# Patient Record
Sex: MALE | Race: BLACK OR AFRICAN AMERICAN | Employment: OTHER | URBAN - METROPOLITAN AREA
[De-identification: names, ages, dates, MRNs, and addresses within clinical notes are randomized per-mention and may not be internally consistent; named-entity substitution may affect disease eponyms.]

---

## 2017-02-21 ENCOUNTER — HOSPITAL ENCOUNTER (OUTPATIENT)
Dept: GENERAL RADIOLOGY | Age: 67
Discharge: HOME | End: 2017-02-21
Payer: MEDICARE

## 2017-02-21 DIAGNOSIS — R05.9 COUGH: ICD-10-CM

## 2017-02-21 PROCEDURE — 71020 XR CHEST PA LAT: CPT

## 2020-10-15 ENCOUNTER — TRANSCRIBE ORDER (OUTPATIENT)
Dept: SCHEDULING | Age: 70
End: 2020-10-15

## 2020-10-15 DIAGNOSIS — R19.06 EPIGASTRIC SWELLING OR MASS OR LUMP: Primary | ICD-10-CM

## 2020-10-15 DIAGNOSIS — M54.50 LUMBAGO: ICD-10-CM

## 2020-10-15 DIAGNOSIS — R10.13 ABDOMINAL PAIN, EPIGASTRIC: ICD-10-CM

## 2020-10-23 ENCOUNTER — HOSPITAL ENCOUNTER (OUTPATIENT)
Dept: CT IMAGING | Age: 70
Discharge: HOME | End: 2020-10-23
Attending: INTERNAL MEDICINE
Payer: MEDICARE

## 2020-10-23 DIAGNOSIS — M54.50 LUMBAGO: ICD-10-CM

## 2020-10-23 DIAGNOSIS — R10.13 ABDOMINAL PAIN, EPIGASTRIC: ICD-10-CM

## 2020-10-23 DIAGNOSIS — R19.06 EPIGASTRIC SWELLING OR MASS OR LUMP: ICD-10-CM

## 2020-10-23 PROCEDURE — 74176 CT ABD & PELVIS W/O CONTRAST: CPT

## 2020-10-23 PROCEDURE — 74011000255 HC RX REV CODE- 255: Performed by: INTERNAL MEDICINE

## 2020-10-23 RX ORDER — BARIUM SULFATE 20 MG/ML
900 SUSPENSION ORAL
Status: COMPLETED | OUTPATIENT
Start: 2020-10-23 | End: 2020-10-23

## 2020-10-23 RX ADMIN — BARIUM SULFATE 900 ML: 20 SUSPENSION ORAL at 18:35

## 2021-02-25 ENCOUNTER — OFFICE VISIT (OUTPATIENT)
Dept: SURGERY | Age: 71
End: 2021-02-25
Payer: MEDICARE

## 2021-02-25 VITALS
DIASTOLIC BLOOD PRESSURE: 90 MMHG | TEMPERATURE: 97.1 F | WEIGHT: 212 LBS | BODY MASS INDEX: 27.22 KG/M2 | SYSTOLIC BLOOD PRESSURE: 156 MMHG | HEART RATE: 56 BPM | OXYGEN SATURATION: 97 %

## 2021-02-25 DIAGNOSIS — Z90.79 STATUS POST PROSTATECTOMY: ICD-10-CM

## 2021-02-25 DIAGNOSIS — Z85.46 HISTORY OF PROSTATE CANCER: ICD-10-CM

## 2021-02-25 DIAGNOSIS — R10.31 RIGHT GROIN PAIN: Primary | ICD-10-CM

## 2021-02-25 DIAGNOSIS — R10.31 RIGHT LOWER QUADRANT ABDOMINAL PAIN: ICD-10-CM

## 2021-02-25 PROCEDURE — G8419 CALC BMI OUT NRM PARAM NOF/U: HCPCS | Performed by: SURGERY

## 2021-02-25 PROCEDURE — G8536 NO DOC ELDER MAL SCRN: HCPCS | Performed by: SURGERY

## 2021-02-25 PROCEDURE — 99205 OFFICE O/P NEW HI 60 MIN: CPT | Performed by: SURGERY

## 2021-02-25 PROCEDURE — G8427 DOCREV CUR MEDS BY ELIG CLIN: HCPCS | Performed by: SURGERY

## 2021-02-25 PROCEDURE — G8432 DEP SCR NOT DOC, RNG: HCPCS | Performed by: SURGERY

## 2021-02-25 PROCEDURE — 1101F PT FALLS ASSESS-DOCD LE1/YR: CPT | Performed by: SURGERY

## 2021-02-25 PROCEDURE — 3017F COLORECTAL CA SCREEN DOC REV: CPT | Performed by: SURGERY

## 2021-02-25 NOTE — PROGRESS NOTES
General Surgery Consult      Ya Merino  Admit date: (Not on file)    MRN: [id number]     : 1950     Age: 66 y.o. Attending Physician: Steven Denney MD, [facility]      History of Present Illness:     Kael Guzmán is a 66 y.o. male who was referred to me for evaluation of right groin pain and possible right inguinal hernia. The patient is relatively healthy but he has a history of hypertension and a history of prostate cancer in the past for which he underwent a robotic prostatectomy in . He stated that for the past few month his been noticing a right groin pain that radiates up toward his right lower quadrant in the abdomen. He stated that this has been happening since the end of last year and they usually it comes and goes but he can point at one area in the right groin where the pain is the most.  He said that the pain does not radiate to the scrotum or the thigh but it only radiate up toward the groin area and up toward the right side of the abdomen. He did not notice any bulge or mass. He stated though he was at the gym with his friend and he told him about the pain and according to him his friend told him that this is a hernia because he had the same symptoms and he was able to feel something in his groin area. The patient denies any nausea or vomiting or any change in bowel habits.   He denies any other abdominal surgeries except his robotic prostatectomy in the past.     Patient Active Problem List    Diagnosis Date Noted    Gross hematuria 2013    Malignant neoplasm of prostate ([address] 2012    Impotence of organic origin 2012     Past Medical History:   Diagnosis Date    Diabetes ([address]     GERD (gastroesophageal reflux disease)     HTN (hypertension)     Malignant neoplasm of prostate ([address]     PT2c NoMx azam 3 + 3 adenoacarcinoma of the prostate s/p DVP, PLND, Bilateral NS       Past Surgical History:   Procedure Laterality Date    HX COLONOSCOPY      HX OTHER SURGICAL  5/2015    Cervical     HX PROSTATECTOMY  2/19/07    HX ROTATOR CUFF REPAIR Bilateral       Social History     Tobacco Use    Smoking status: Former Smoker    Smokeless tobacco: Never Used    Tobacco comment: 12/2/2005   Substance Use Topics    Alcohol use: No      Social History     Tobacco Use   Smoking Status Former Smoker   Smokeless Tobacco Never Used   Tobacco Comment    12/2/2005     Family History   Problem Relation Age of Onset    Diabetes Mother       Current Outpatient Medications   Medication Sig    amLODIPine-benazepril (LOTREL) 5-40 mg per capsule Take 1 Cap by mouth daily.  ergocalciferol (VITAMIN D2) 50,000 unit capsule Take 50,000 Units by mouth every seven (7) days.  atorvastatin (LIPITOR) 20 mg tablet Take  by mouth nightly.  metFORMIN (GLUCOPHAGE) 500 mg tablet Take  by mouth two (2) times daily (with meals).  ezetimibe (ZETIA) 10 mg tablet Take  by mouth. No current facility-administered medications for this visit.        No Known Allergies     Review of Systems:  Constitutional: negative  Eyes: negative  Ears, Nose, Mouth, Throat, and Face: negative  Respiratory: negative  Cardiovascular: negative  Gastrointestinal: positive for abdominal pain and Right groin pain  Genitourinary:negative  Integument/Breast: negative  Hematologic/Lymphatic: negative  Musculoskeletal:negative  Neurological: negative  Behavioral/Psychiatric: negative  Endocrine: negative  Allergic/Immunologic: negative    Objective:     Visit Vitals  BP (!) 156/90 (BP 1 Location: Right arm, BP Patient Position: Sitting)   Pulse (!) 56   Temp 97.1 °F (36.2 °C)   Wt 96.2 kg (212 lb)   SpO2 97%   BMI 27.22 kg/m²       Physical Exam:      General:  in no apparent distress, alert, oriented times 3, afebrile, normal vitals and cooperative   Eyes:  conjunctivae and sclerae normal, pupils equal, round, reactive to light   Throat & Neck: no erythema or exudates noted and neck supple and symmetrical; no palpable masses   Lungs:   clear to auscultation bilaterally   Heart:  Regular rate and rhythm   Abdomen:   rounded, soft, nontender, nondistended, no masses or organomegaly. On examination of his right groin area there is significant tenderness at the internal ring with possible evidence of a hernia but I was not able to feel it very clearly. Scrotal exam is normal.    Extremities: extremities normal, atraumatic, no cyanosis or edema   Skin: Normal.       Imaging and Lab Review:     CBC:   Lab Results   Component Value Date/Time    WBC 7.0 04/25/2015 04:00 PM    RBC 5.40 04/25/2015 04:00 PM    HGB 14.7 04/25/2015 04:00 PM    HCT 44.2 04/25/2015 04:00 PM    PLATELET 120 38/75/7657 04:00 PM     BMP:   Lab Results   Component Value Date/Time    Glucose 134 (H) 04/25/2015 04:00 PM    Sodium 141 04/25/2015 04:00 PM    Potassium 3.6 04/25/2015 04:00 PM    Chloride 106 04/25/2015 04:00 PM    CO2 28 04/25/2015 04:00 PM    BUN 17 04/25/2015 04:00 PM    Creatinine 1.3 10/05/2019 08:55 AM    Calcium 9.5 04/25/2015 04:00 PM     CMP:  Lab Results   Component Value Date/Time    Glucose 134 (H) 04/25/2015 04:00 PM    Sodium 141 04/25/2015 04:00 PM    Potassium 3.6 04/25/2015 04:00 PM    Chloride 106 04/25/2015 04:00 PM    CO2 28 04/25/2015 04:00 PM    BUN 17 04/25/2015 04:00 PM    Creatinine 1.3 10/05/2019 08:55 AM    Calcium 9.5 04/25/2015 04:00 PM    Anion gap 7 04/25/2015 04:00 PM    BUN/Creatinine ratio 14 04/25/2015 04:00 PM    Alk. phosphatase 92 08/16/2010 02:44 PM    Protein, total 7.2 08/16/2010 02:44 PM    Albumin 4.3 08/16/2010 02:44 PM    Globulin 2.9 08/16/2010 02:44 PM    A-G Ratio 1.5 08/16/2010 02:44 PM       No results found for this or any previous visit (from the past 24 hour(s)).     images and reports reviewed    Assessment:   Michael Rios is a 66 y.o. male who is presenting with right groin pain of about 5 months duration with a physical exam that could be evidence of a right inguinal hernia but I explained to the patient that I am not 100% sure. He had a CT scan done last year that did not show any evidence of abdominal wall hernia but his symptoms according to him started after the CT scan was performed. His CT scan was done because of epigastric pain but not because of right groin pain. I explained to the patient that the best option is to get an ultrasound to confirm the presence of this hernia but I also explained to him that sometimes hernias are not seen on ultrasound especially if they are extremely small or if there is a femoral hernia. In case it is a hernia I did discussed the possibility of incarceration, strangulation, enlargement in size over time, and the risk of emergency surgery in the face of strangulation. I also discussed the use of prosthetic materials (mesh), including the risk of infection. Also discussed the risk of surgery including recurrence and the possible need for reoperation and removal of mesh if used, possibility of postoperative small bowel injury, obstruction or ileus, and the risks of general anesthetic. I explained to the the patient about the robotic hernia repair procedure.      Plan:     I placed an order of ultrasound of the right scrotum with the focusing in the right groin area  Follow-up with me after the results    Please call me if you have any questions (cell phone: [phone])     Signed By: Zack Poole MD     February 25, 2021

## 2021-02-25 NOTE — PROGRESS NOTES
Petar Goins is a 66 y.o. male (: 1950) presenting to address:    Chief Complaint   Patient presents with    New Patient     Right inguinal hernia x couple of months/ referred by Dr. Yusef Scherer       Medication list and allergies have been reviewed with Petar Goins and updated as of today's date. I have gone over all Medical, Surgical and Social History with Petar Goins and updated/added the information accordingly.

## 2021-02-26 ENCOUNTER — TELEPHONE (OUTPATIENT)
Dept: SURGERY | Age: 71
End: 2021-02-26

## 2021-02-26 DIAGNOSIS — R10.31 RIGHT GROIN PAIN: ICD-10-CM

## 2021-02-26 NOTE — TELEPHONE ENCOUNTER
Spoke to Mr. Leola Faust regarding request to schedule hernia surgery at [facility] because his wife doesn't drive and it's difficult for him to get a friend/family member to

## 2021-03-01 ENCOUNTER — HOSPITAL ENCOUNTER (OUTPATIENT)
Dept: ULTRASOUND IMAGING | Age: 71
Discharge: HOME | End: 2021-03-01
Attending: SURGERY
Payer: MEDICARE

## 2021-03-01 PROCEDURE — 76870 US EXAM SCROTUM: CPT

## 2021-03-11 ENCOUNTER — OFFICE VISIT (OUTPATIENT)
Dept: SURGERY | Age: 71
End: 2021-03-11
Payer: MEDICARE

## 2021-03-11 VITALS
WEIGHT: 206 LBS | DIASTOLIC BLOOD PRESSURE: 86 MMHG | RESPIRATION RATE: 16 BRPM | HEART RATE: 60 BPM | HEIGHT: 74 IN | BODY MASS INDEX: 26.44 KG/M2 | SYSTOLIC BLOOD PRESSURE: 148 MMHG | TEMPERATURE: 97.3 F

## 2021-03-11 DIAGNOSIS — R10.31 RIGHT GROIN PAIN: Primary | ICD-10-CM

## 2021-03-11 DIAGNOSIS — R10.31 RIGHT LOWER QUADRANT ABDOMINAL PAIN: ICD-10-CM

## 2021-03-11 PROCEDURE — G8536 NO DOC ELDER MAL SCRN: HCPCS | Performed by: SURGERY

## 2021-03-11 PROCEDURE — 99215 OFFICE O/P EST HI 40 MIN: CPT | Performed by: SURGERY

## 2021-03-11 PROCEDURE — G8419 CALC BMI OUT NRM PARAM NOF/U: HCPCS | Performed by: SURGERY

## 2021-03-11 PROCEDURE — 3017F COLORECTAL CA SCREEN DOC REV: CPT | Performed by: SURGERY

## 2021-03-11 PROCEDURE — G8432 DEP SCR NOT DOC, RNG: HCPCS | Performed by: SURGERY

## 2021-03-11 PROCEDURE — G8427 DOCREV CUR MEDS BY ELIG CLIN: HCPCS | Performed by: SURGERY

## 2021-03-11 PROCEDURE — 1101F PT FALLS ASSESS-DOCD LE1/YR: CPT | Performed by: SURGERY

## 2021-03-11 NOTE — PROGRESS NOTES
1. Have you been to the ER, urgent care clinic since your last visit? Hospitalized since your last visit? No    2. Have you seen or consulted any other health care providers outside of the [address] [place] [address] since your last visit? Include any pap smears or colon screening. No     Patient presents for test results from [facility] [facility] on 3/1/21.

## 2021-03-11 NOTE — PATIENT INSTRUCTIONS
If you have any questions or concerns about today's appointment, the verbal and/or written instructions you were given for follow up care, please call our office at [phone]. [facility] - [address], [address] 
 
[phone] office [phone] fax PATIENT PRE AND POST OPERATIVE INSTRUCTIONS [facility] [address] [place] [address], [address] 
[phone] Before Surgery Instructions:  
1) You must have someone available to drive you to and from your procedure and stay with you for the first 24 hours. 2) It is very important that you have nothing to eat or drink after midnight the night before your surgery. This includes chewing gum or sucking on hard candy. Take only heart, blood pressure and cholesterol medications the morning of surgery with only a sip of water. 3) Please stop taking Plavix 5-7 days prior to your surgery. Stop taking Coumadin 5 days prior to your surgery. Stop taking all Aspirin or Aspirin containing products 7 days prior to your surgery. Stop taking Advil, Motrin, Aleve, and etc. 3 days prior to your surgery. 4) If you take any diabetic medications please consult with your primary care physician on how to take them on the day of your surgery 5) Please stop all Herbal products 2 weeks prior to your surgery. 6) Please arrive at the hospital 2 hours prior to your surgery, unless you have been otherwise instructed. 7) Patients having an operation on their colon will be given a separate instruction sheet on their Bowel Prep. 8) For any pre-operative work up check in at the main entrance to [facility], and then go to Patient Registration. These studies are done on a walk in basis they are open from 7:00am to 5:00pm Monday through Friday. 9) Please wash your surgical site the morning of your surgery with soap and water.  
10) If you are of child bearing age you will have pregnancy test done the morning of your surgery as soon as you arrive. 11) You may be contacted to change your surgery time. At times this is necessary due to equipment or staffing needs. 12)  Please have COVID test done on Monday, March 15, 2021. You may walk in for testing 7:00am to 11:00am 
 
Please be advised it's your responsibility to notify our office of any changes to your healthcare coverage. Failure to notify our office of any changes to your health care coverage may result in denial of payment by your health insurance for all incurred services and you would be responsible for payment for all incurred services. After Surgery Instructions: You will need to be seen in the office for a follow-up visit 7-14 days after your surgery. Please call after you have had the procedure to make this appointment. Unless otherwise instructed, you may remove your outer bandage and shower 48 hours after your surgery. If you develop a fever greater than 101, have any significant drainage, bleeding, swelling and/or pus of the wound. Please call our office immediately. Surgery Date and Time:  Friday, March 19, 2021 at 2:00pm 
 
Please enter [facility] main entrance on the first floor and go to Patient Registration. Once registered, a member of our team will escort you to the second floor. Please check in by 12:00pm the day of your surgery. You may contact Evelio Reynolds with any questions at [phone].

## 2021-03-11 NOTE — PROGRESS NOTES
General Surgery Consult      Carialvin Merino  Admit date: (Not on file)    MRN: [id number]     : 1950     Age: 66 y.o. Attending Physician: Jair Grimes MD, [facility]      History of Present Illness:     Kai Chaudhary is a 66 y.o. male who is here for follow-up for evaluation of a possible right inguinal hernia. I have seen the patient last month for evaluation of right groin and right lower quadrant abdominal pain. The patient had a history of robotic prostatectomy for prostate cancer and he has a history of epigastric pain in the past for which a CT scan of abdomen and pelvis was done 1 year ago for epigastric pain and it did not show any major pathology. However the patient has stated that he has developed a right groin pain that radiates to his right lower quadrant as well as sometimes to his right scrotal area. He was able to point at one area located at the internal ring so I was highly suspicious of a right inguinal hernia when I saw him last month. However because of his CT scan that was done 1 year ago and because of lack of clear hernia on examination I decided to get an ultrasound. 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 He did not show a clear evidence of a large hernia but it showed a possible right inguinal hernia containing fat based on the ultrasound. This was based on the site with the patient has the most significant pain. Interestingly the patient has stated that his pain is still localized at one point exactly what the ultrasound showed a possible hernia but he also complains of pain that radiates to his right lower quadrant. He denies any fever or chills. He denies any change in bowel habits.  He denies any other abdominal surgeries except his robotic prostatectomy in the past.     Patient Active Problem List    Diagnosis Date Noted    Gross hematuria 09/13/2013    Malignant neoplasm of prostate ([facility] 02/06/2012    Impotence of organic origin 02/06/2012     Past Medical History:   Diagnosis Date    Diabetes ([facility]     GERD (gastroesophageal reflux disease)     HTN (hypertension)     Malignant neoplasm of prostate ([facility])     PT2c NoMx azam 3 + 3 adenoacarcinoma of the prostate s/p DVP, PLND, Bilateral NS 2/07      Past Surgical History:   Procedure Laterality Date    HX COLONOSCOPY      HX OTHER SURGICAL  5/2015    Cervical     HX PROSTATECTOMY  2/19/07    HX ROTATOR CUFF REPAIR Bilateral       Social History     Tobacco Use    Smoking status: Former Smoker    Smokeless tobacco: Never Used    Tobacco comment: 12/2/2005   Substance Use Topics    Alcohol use: No      Social History     Tobacco Use   Smoking Status Former Smoker   Smokeless Tobacco Never Used   Tobacco Comment    12/2/2005     Family History   Problem Relation Age of Onset    Diabetes Mother       Current Outpatient Medications   Medication Sig    amLODIPine-benazepril (LOTREL) 5-40 mg per capsule Take 1 Cap by mouth daily.  ergocalciferol (VITAMIN D2) 50,000 unit capsule Take 50,000 Units by mouth every seven (7) days.  atorvastatin (LIPITOR) 20 mg tablet Take  by mouth nightly.  metFORMIN (GLUCOPHAGE) 500 mg tablet Take  by mouth two (2) times daily (with meals).  ezetimibe (ZETIA) 10 mg tablet Take  by mouth. No current facility-administered medications for this visit.        No Known Allergies     Review of Systems:  Constitutional: negative  Eyes: negative  Ears, Nose, Mouth, Throat, and Face: negative  Respiratory: negative  Cardiovascular: negative  Gastrointestinal: positive for abdominal pain and Right groin pain  Genitourinary:positive for Right scrotal discomfort  Integument/Breast: negative  Hematologic/Lymphatic: negative  Musculoskeletal:negative  Neurological: negative  Behavioral/Psychiatric: negative  Endocrine: negative  Allergic/Immunologic: negative    Objective:     Visit Vitals  BP (!) 148/86 (BP 1 Location: Right arm, BP Patient Position: Sitting) Comment: pt did not take BP meds this morning   Pulse 60   Temp 97.3 °F (36.3 °C) (Skin)   Resp 16   Ht 6' 2\" (1.88 m)   Wt 93.4 kg (206 lb)   BMI 26.45 kg/m²       Physical Exam:      General:  in no apparent distress, alert, oriented times 3, afebrile, normal vitals and cooperative   Eyes:  conjunctivae and sclerae normal, pupils equal, round, reactive to light   Throat & Neck: no erythema or exudates noted and neck supple and symmetrical; no palpable masses   Lungs:   clear to auscultation bilaterally   Heart:  Regular rate and rhythm   Abdomen:   rounded, soft, nontender, nondistended, no masses or organomegaly. On examination of his right groin area there is significant tenderness at the internal ring with possible evidence of a hernia but I was not able to feel it very clearly.  Scrotal exam is normal.    Extremities: extremities normal, atraumatic, no cyanosis or edema   Skin: Normal.       Imaging and Lab Review:     CBC:   Lab Results   Component Value Date/Time    WBC 7.0 04/25/2015 04:00 PM    RBC 5.40 04/25/2015 04:00 PM    HGB 14.7 04/25/2015 04:00 PM    HCT 44.2 04/25/2015 04:00 PM    PLATELET 728 84/12/9284 04:00 PM     BMP:   Lab Results   Component Value Date/Time    Glucose 134 (H) 04/25/2015 04:00 PM    Sodium 141 04/25/2015 04:00 PM    Potassium 3.6 04/25/2015 04:00 PM    Chloride 106 04/25/2015 04:00 PM    CO2 28 04/25/2015 04:00 PM    BUN 17 04/25/2015 04:00 PM    Creatinine 1.3 10/05/2019 08:55 AM    Calcium 9.5 04/25/2015 04:00 PM     CMP:  Lab Results   Component Value Date/Time    Glucose 134 (H) 04/25/2015 04:00 PM    Sodium 141 04/25/2015 04:00 PM    Potassium 3.6 04/25/2015 04:00 PM    Chloride 106 04/25/2015 04:00 PM    CO2 28 04/25/2015 04:00 PM    BUN 17 04/25/2015 04:00 PM    Creatinine 1.3 10/05/2019 08:55 AM    Calcium 9.5 04/25/2015 04:00 PM    Anion gap 7 04/25/2015 04:00 PM    BUN/Creatinine ratio 14 04/25/2015 04:00 PM    Alk. phosphatase 92 08/16/2010 02:44 PM    Protein, total 7.2 08/16/2010 02:44 PM    Albumin 4.3 08/16/2010 02:44 PM    Globulin 2.9 08/16/2010 02:44 PM    A-G Ratio 1.5 08/16/2010 02:44 PM       No results found for this or any previous visit (from the past 24 hour(s)). images and reports reviewed    Assessment:   Elena Wray is a 66 y.o. male who is presenting with an interesting clinical scenario. The patient has been having right groin pain for about 5 months. He stated that the pain is located at one exact place where he can point with his finger but it also radiates to his right lower quadrant and sometimes to his right scrotal area. He had a CT scan 1 year ago that showed no pathology but the patient pain started after the CT scan was performed. 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 At the site of his significant pain the ultrasound technologist found a area that is suspicious for a fat-containing inguinal hernia but no bowel. The patient has stated that his pain is still located in the same area and it is even getting slightly worse but is not getting better for sure. 1st I explained to the patient that his varicocele and granuloma needs to be discussed with his urologist.  He stated that he will follow-up with them. As for his right groin pain I still believe that the patient most likely has a hernia but again this is not confirmed by the ultrasound but it is highly suspicious on the ultrasound finding and based on my examination his tenderness is located at the site of the ultrasound finding of possible fat-containing hernia. I gave the patient the option of repeating the CT scan or proceeding with the exploration and possible repair of his right inguinal hernia.   The patient stated that even if the CT scan is negative he still would like us to proceed with the surgery so I do not see a reason to do the CT scan. I think the best option is to do exploratory laparoscopy/robotic and then proceed with the robotic right inguinal hernia and possible other abdominal wall hernias if found. I Discussed the possibility of incarceration, strangulation, enlargement in size over time, and the risk of emergency surgery in the face of strangulation. I also discussed the use of prosthetic materials (mesh), including the risk of infection. Also discussed the risk of surgery including recurrence and the possible need for reoperation and removal of mesh if used, possibility of postoperative small bowel injury, obstruction or ileus, and the risks of general anesthetic. I explained to the the patient about the robotic hernia repair procedure. Plan:     Schedule for robotic right inguinal hernia repair with placement of mesh, possible abdominal wall hernia repair with mesh as well.       Please call me if you have any questions (cell phone: [phone])     Signed By: Kerri Monahan MD     March 11, 2021

## 2021-03-12 ENCOUNTER — TELEPHONE (OUTPATIENT)
Dept: SURGERY | Age: 71
End: 2021-03-12

## 2021-03-12 RX ORDER — NAPROXEN SODIUM 220 MG
220 TABLET ORAL AS NEEDED
COMMUNITY
End: 2021-05-03

## 2021-03-12 NOTE — TELEPHONE ENCOUNTER
Spoke to Mr. Danyelle Rodriguez and his daughter per patient request to inform of surgery time change on Friday, March 19, 2021 at [facility]. Mr. Danyelle Rodriguez verbalized understanding.

## 2021-03-15 ENCOUNTER — HOSPITAL ENCOUNTER (OUTPATIENT)
Dept: PREADMISSION TESTING | Age: 71
Discharge: HOME | End: 2021-03-15
Payer: MEDICARE

## 2021-03-15 ENCOUNTER — TRANSCRIBE ORDER (OUTPATIENT)
Dept: REGISTRATION | Age: 71
End: 2021-03-15

## 2021-03-15 ENCOUNTER — TELEPHONE (OUTPATIENT)
Dept: SURGERY | Age: 71
End: 2021-03-15

## 2021-03-15 DIAGNOSIS — Z01.812 BLOOD TESTS PRIOR TO TREATMENT OR PROCEDURE: ICD-10-CM

## 2021-03-15 DIAGNOSIS — Z20.828 EXPOSURE TO SARS-ASSOCIATED CORONAVIRUS: ICD-10-CM

## 2021-03-15 DIAGNOSIS — Z01.812 BLOOD TESTS PRIOR TO TREATMENT OR PROCEDURE: Primary | ICD-10-CM

## 2021-03-15 PROCEDURE — U0003 INFECTIOUS AGENT DETECTION BY NUCLEIC ACID (DNA OR RNA); SEVERE ACUTE RESPIRATORY SYNDROME CORONAVIRUS 2 (SARS-COV-2) (CORONAVIRUS DISEASE [COVID-19]), AMPLIFIED PROBE TECHNIQUE, MAKING USE OF HIGH THROUGHPUT TECHNOLOGIES AS DESCRIBED BY CMS-2020-01-R: HCPCS

## 2021-03-15 NOTE — TELEPHONE ENCOUNTER
Spoke to Mr. Collinser Jordan to inform per Pretty Voss at Dr. Rain Edwards office Jose Klein would like him to stop the metformin two days prior to scheduled surgery on Friday, March 19, 2021 with Dr. Rhys Lima to please contact Florinda at Dr. Rain Edwards office.

## 2021-03-16 LAB — SARS-COV-2, COV2NT: NOT DETECTED

## 2021-03-18 ENCOUNTER — ANESTHESIA EVENT (OUTPATIENT)
Dept: SURGERY | Age: 71
End: 2021-03-18
Payer: MEDICARE

## 2021-03-18 ENCOUNTER — OFFICE VISIT (OUTPATIENT)
Dept: SURGERY | Age: 71
End: 2021-03-18
Payer: MEDICARE

## 2021-03-18 VITALS
BODY MASS INDEX: 25.68 KG/M2 | OXYGEN SATURATION: 95 % | DIASTOLIC BLOOD PRESSURE: 82 MMHG | SYSTOLIC BLOOD PRESSURE: 148 MMHG | TEMPERATURE: 97.3 F | HEART RATE: 86 BPM | WEIGHT: 200 LBS

## 2021-03-18 DIAGNOSIS — R10.31 RIGHT GROIN PAIN: Primary | ICD-10-CM

## 2021-03-18 PROCEDURE — G8536 NO DOC ELDER MAL SCRN: HCPCS | Performed by: SURGERY

## 2021-03-18 PROCEDURE — 1101F PT FALLS ASSESS-DOCD LE1/YR: CPT | Performed by: SURGERY

## 2021-03-18 PROCEDURE — 99212 OFFICE O/P EST SF 10 MIN: CPT | Performed by: SURGERY

## 2021-03-18 PROCEDURE — G8427 DOCREV CUR MEDS BY ELIG CLIN: HCPCS | Performed by: SURGERY

## 2021-03-18 PROCEDURE — 3017F COLORECTAL CA SCREEN DOC REV: CPT | Performed by: SURGERY

## 2021-03-18 PROCEDURE — G8419 CALC BMI OUT NRM PARAM NOF/U: HCPCS | Performed by: SURGERY

## 2021-03-18 PROCEDURE — G8432 DEP SCR NOT DOC, RNG: HCPCS | Performed by: SURGERY

## 2021-03-18 NOTE — PROGRESS NOTES
Melani Laguerre is a 66 y.o. male (: 1950) presenting to address:    Chief Complaint   Patient presents with    Follow-up     Discuss results for [address] of scrotum/testicles 21       Medication list and allergies have been reviewed with Melani Laguerre and updated as of today's date. I have gone over all Medical, Surgical and Social History with Melani Laguerre and updated/added the information accordingly. 1. Have you been to the ER, Urgent Care or Hospitalized since your last visit? NO      2. Have you followed up with your PCP or any other Physicians since your procedure/ last office visit?    NO

## 2021-03-18 NOTE — PROGRESS NOTES
Patient stated that he is here today just to discuss with me the hernia surgery tomorrow. He stated that he was kind of anxious and a little bit concerned but he stated that he spoke with his daughter and he stated that they look me up on the Internet and they feel very comfortable and happy with my reviews and he would like to proceed with the surgery. I asked the patient to tell me exactly what his concern and how can I help to answer them and he stated that he does not have any major concern but he just wanted make sure that I am going to address the pain in his right groin. I again explained to the patient that there is no clear evidence 100% that he has a right inguinal hernia but based on the physical examination with tenderness at the internal ring as well as the ultrasound that showed a possible right inguinal hernia containing fat which I explained to him is a lipoma, that most likely he has a hernia and this is the reason for his pain because he is very tender at this area. But I also explained to him that his ultrasound showed also a hydrocele on both side as well as a granuloma most likely represent a sperm granuloma which also could cause discomfort and explained to him as before that he needs to follow-up with urology for that. The patient stated that he is aware of that now and he stated he will follow-up with the urology and he would like to proceed with the surgery tomorrow.

## 2021-03-19 ENCOUNTER — HOSPITAL ENCOUNTER (OUTPATIENT)
Age: 71
Setting detail: OUTPATIENT SURGERY
Discharge: HOME | End: 2021-03-19
Attending: SURGERY | Admitting: SURGERY
Payer: MEDICARE

## 2021-03-19 ENCOUNTER — ANESTHESIA (OUTPATIENT)
Dept: SURGERY | Age: 71
End: 2021-03-19
Payer: MEDICARE

## 2021-03-19 VITALS
SYSTOLIC BLOOD PRESSURE: 128 MMHG | TEMPERATURE: 97 F | WEIGHT: 199 LBS | HEIGHT: 74 IN | BODY MASS INDEX: 25.54 KG/M2 | OXYGEN SATURATION: 100 % | HEART RATE: 70 BPM | RESPIRATION RATE: 20 BRPM | DIASTOLIC BLOOD PRESSURE: 72 MMHG

## 2021-03-19 DIAGNOSIS — Z87.19 S/P HERNIA REPAIR: Primary | ICD-10-CM

## 2021-03-19 DIAGNOSIS — Z98.890 S/P HERNIA REPAIR: Primary | ICD-10-CM

## 2021-03-19 LAB
GLUCOSE BLD STRIP.AUTO-MCNC: 116 MG/DL (ref 70–110)
GLUCOSE BLD STRIP.AUTO-MCNC: 121 MG/DL (ref 70–110)
HBA1C MFR BLD: 6.1 % (ref 4.2–5.6)

## 2021-03-19 PROCEDURE — 51798 US URINE CAPACITY MEASURE: CPT

## 2021-03-19 PROCEDURE — 77030008683 HC TU ET CUF COVD -A: Performed by: ANESTHESIOLOGY

## 2021-03-19 PROCEDURE — 74011250636 HC RX REV CODE- 250/636: Performed by: NURSE ANESTHETIST, CERTIFIED REGISTERED

## 2021-03-19 PROCEDURE — 77030040361 HC SLV COMPR DVT MDII -B: Performed by: SURGERY

## 2021-03-19 PROCEDURE — 77030035277 HC OBTRTR BLDELSS DISP INTU -B: Performed by: SURGERY

## 2021-03-19 PROCEDURE — 76210000024 HC REC RM PH II 2.5 TO 3 HR: Performed by: SURGERY

## 2021-03-19 PROCEDURE — 99100 ANES PT EXTEME AGE<1 YR&>70: CPT | Performed by: NURSE ANESTHETIST, CERTIFIED REGISTERED

## 2021-03-19 PROCEDURE — 76060000032 HC ANESTHESIA 0.5 TO 1 HR: Performed by: SURGERY

## 2021-03-19 PROCEDURE — 77030003578 HC NDL INSUF VERES AMR -B: Performed by: SURGERY

## 2021-03-19 PROCEDURE — 99100 ANES PT EXTEME AGE<1 YR&>70: CPT | Performed by: ANESTHESIOLOGY

## 2021-03-19 PROCEDURE — 77030031139 HC SUT VCRL2 J&J -A: Performed by: SURGERY

## 2021-03-19 PROCEDURE — 77030022704 HC SUT VLOC COVD -B: Performed by: SURGERY

## 2021-03-19 PROCEDURE — 49650 LAP ING HERNIA REPAIR INIT: CPT | Performed by: SURGERY

## 2021-03-19 PROCEDURE — 82962 GLUCOSE BLOOD TEST: CPT

## 2021-03-19 PROCEDURE — C1781 MESH (IMPLANTABLE): HCPCS | Performed by: SURGERY

## 2021-03-19 PROCEDURE — 74011250636 HC RX REV CODE- 250/636: Performed by: SURGERY

## 2021-03-19 PROCEDURE — 74011250637 HC RX REV CODE- 250/637: Performed by: NURSE ANESTHETIST, CERTIFIED REGISTERED

## 2021-03-19 PROCEDURE — 74011000250 HC RX REV CODE- 250: Performed by: NURSE ANESTHETIST, CERTIFIED REGISTERED

## 2021-03-19 PROCEDURE — 77030026438 HC STYL ET INTUB CARD -A: Performed by: ANESTHESIOLOGY

## 2021-03-19 PROCEDURE — 83036 HEMOGLOBIN GLYCOSYLATED A1C: CPT

## 2021-03-19 PROCEDURE — 77030010507 HC ADH SKN DERMBND J&J -B: Performed by: SURGERY

## 2021-03-19 PROCEDURE — 00840 ANES IPER PX LOWER ABD NOS: CPT | Performed by: NURSE ANESTHETIST, CERTIFIED REGISTERED

## 2021-03-19 PROCEDURE — 76210000016 HC OR PH I REC 1 TO 1.5 HR: Performed by: SURGERY

## 2021-03-19 PROCEDURE — 77030020703 HC SEAL CANN DISP INTU -B: Performed by: SURGERY

## 2021-03-19 PROCEDURE — 2709999900 HC NON-CHARGEABLE SUPPLY: Performed by: SURGERY

## 2021-03-19 PROCEDURE — 77030040922 HC BLNKT HYPOTHRM STRY -A: Performed by: SURGERY

## 2021-03-19 PROCEDURE — 76010000933 HC OR TIME 0.5 TO 1HR INTENSV - TIER 2: Performed by: SURGERY

## 2021-03-19 PROCEDURE — 00840 ANES IPER PX LOWER ABD NOS: CPT | Performed by: ANESTHESIOLOGY

## 2021-03-19 PROCEDURE — S2900 ROBOTIC SURGICAL SYSTEM: HCPCS | Performed by: SURGERY

## 2021-03-19 PROCEDURE — 77030002933 HC SUT MCRYL J&J -A: Performed by: SURGERY

## 2021-03-19 DEVICE — LAPAROSCOPIC SELF-FIXATING MESH POLYESTER WITH POLYLACTIC ACID GRIPS AND COLLAGEN FILM
Type: IMPLANTABLE DEVICE | Site: INGUINAL | Status: FUNCTIONAL
Brand: PROGRIP

## 2021-03-19 RX ORDER — HYDROMORPHONE HYDROCHLORIDE 2 MG/ML
0.5 INJECTION, SOLUTION INTRAMUSCULAR; INTRAVENOUS; SUBCUTANEOUS
Status: DISCONTINUED | OUTPATIENT
Start: 2021-03-19 | End: 2021-03-19 | Stop reason: HOSPADM

## 2021-03-19 RX ORDER — NEOSTIGMINE METHYLSULFATE 1 MG/ML
INJECTION, SOLUTION INTRAVENOUS AS NEEDED
Status: DISCONTINUED | OUTPATIENT
Start: 2021-03-19 | End: 2021-03-19 | Stop reason: HOSPADM

## 2021-03-19 RX ORDER — ONDANSETRON 2 MG/ML
INJECTION INTRAMUSCULAR; INTRAVENOUS AS NEEDED
Status: DISCONTINUED | OUTPATIENT
Start: 2021-03-19 | End: 2021-03-19 | Stop reason: HOSPADM

## 2021-03-19 RX ORDER — DEXTROSE 50 % IN WATER (D50W) INTRAVENOUS SYRINGE
25-50 AS NEEDED
Status: DISCONTINUED | OUTPATIENT
Start: 2021-03-19 | End: 2021-03-19 | Stop reason: HOSPADM

## 2021-03-19 RX ORDER — EPHEDRINE SULFATE/0.9% NACL/PF 25 MG/5 ML
SYRINGE (ML) INTRAVENOUS AS NEEDED
Status: DISCONTINUED | OUTPATIENT
Start: 2021-03-19 | End: 2021-03-19 | Stop reason: HOSPADM

## 2021-03-19 RX ORDER — SODIUM CHLORIDE 0.9 % (FLUSH) 0.9 %
5-40 SYRINGE (ML) INJECTION EVERY 8 HOURS
Status: DISCONTINUED | OUTPATIENT
Start: 2021-03-19 | End: 2021-03-19 | Stop reason: HOSPADM

## 2021-03-19 RX ORDER — DEXAMETHASONE SODIUM PHOSPHATE 4 MG/ML
INJECTION, SOLUTION INTRA-ARTICULAR; INTRALESIONAL; INTRAMUSCULAR; INTRAVENOUS; SOFT TISSUE AS NEEDED
Status: DISCONTINUED | OUTPATIENT
Start: 2021-03-19 | End: 2021-03-19

## 2021-03-19 RX ORDER — MAGNESIUM SULFATE 100 %
4 CRYSTALS MISCELLANEOUS AS NEEDED
Status: DISCONTINUED | OUTPATIENT
Start: 2021-03-19 | End: 2021-03-19 | Stop reason: HOSPADM

## 2021-03-19 RX ORDER — GLYCOPYRROLATE 0.2 MG/ML
INJECTION INTRAMUSCULAR; INTRAVENOUS AS NEEDED
Status: DISCONTINUED | OUTPATIENT
Start: 2021-03-19 | End: 2021-03-19 | Stop reason: HOSPADM

## 2021-03-19 RX ORDER — CEFAZOLIN SODIUM 2 G/50ML
2 SOLUTION INTRAVENOUS
Status: COMPLETED | OUTPATIENT
Start: 2021-03-19 | End: 2021-03-19

## 2021-03-19 RX ORDER — OXYCODONE AND ACETAMINOPHEN 5; 325 MG/1; MG/1
1 TABLET ORAL
Qty: 24 TAB | Refills: 0 | Status: SHIPPED | OUTPATIENT
Start: 2021-03-19 | End: 2021-03-22

## 2021-03-19 RX ORDER — HYDROCODONE BITARTRATE AND ACETAMINOPHEN 5; 325 MG/1; MG/1
1 TABLET ORAL ONCE
Status: DISCONTINUED | OUTPATIENT
Start: 2021-03-19 | End: 2021-03-19 | Stop reason: HOSPADM

## 2021-03-19 RX ORDER — FAMOTIDINE 20 MG/1
20 TABLET, FILM COATED ORAL ONCE
Status: COMPLETED | OUTPATIENT
Start: 2021-03-19 | End: 2021-03-19

## 2021-03-19 RX ORDER — LIDOCAINE HYDROCHLORIDE 20 MG/ML
INJECTION, SOLUTION EPIDURAL; INFILTRATION; INTRACAUDAL; PERINEURAL AS NEEDED
Status: DISCONTINUED | OUTPATIENT
Start: 2021-03-19 | End: 2021-03-19 | Stop reason: HOSPADM

## 2021-03-19 RX ORDER — FENTANYL CITRATE 50 UG/ML
INJECTION, SOLUTION INTRAMUSCULAR; INTRAVENOUS AS NEEDED
Status: DISCONTINUED | OUTPATIENT
Start: 2021-03-19 | End: 2021-03-19 | Stop reason: HOSPADM

## 2021-03-19 RX ORDER — SODIUM CHLORIDE 0.9 % (FLUSH) 0.9 %
5-40 SYRINGE (ML) INJECTION AS NEEDED
Status: DISCONTINUED | OUTPATIENT
Start: 2021-03-19 | End: 2021-03-19 | Stop reason: HOSPADM

## 2021-03-19 RX ORDER — SUCCINYLCHOLINE CHLORIDE 20 MG/ML
INJECTION INTRAMUSCULAR; INTRAVENOUS AS NEEDED
Status: DISCONTINUED | OUTPATIENT
Start: 2021-03-19 | End: 2021-03-19 | Stop reason: HOSPADM

## 2021-03-19 RX ORDER — SODIUM CHLORIDE, SODIUM LACTATE, POTASSIUM CHLORIDE, CALCIUM CHLORIDE 600; 310; 30; 20 MG/100ML; MG/100ML; MG/100ML; MG/100ML
50 INJECTION, SOLUTION INTRAVENOUS CONTINUOUS
Status: DISCONTINUED | OUTPATIENT
Start: 2021-03-19 | End: 2021-03-19 | Stop reason: HOSPADM

## 2021-03-19 RX ORDER — INSULIN LISPRO 100 [IU]/ML
INJECTION, SOLUTION INTRAVENOUS; SUBCUTANEOUS ONCE
Status: DISCONTINUED | OUTPATIENT
Start: 2021-03-19 | End: 2021-03-19 | Stop reason: HOSPADM

## 2021-03-19 RX ORDER — LIDOCAINE HYDROCHLORIDE 10 MG/ML
0.1 INJECTION, SOLUTION EPIDURAL; INFILTRATION; INTRACAUDAL; PERINEURAL AS NEEDED
Status: DISCONTINUED | OUTPATIENT
Start: 2021-03-19 | End: 2021-03-19 | Stop reason: HOSPADM

## 2021-03-19 RX ORDER — HYDROMORPHONE HYDROCHLORIDE 2 MG/ML
0.2 INJECTION, SOLUTION INTRAMUSCULAR; INTRAVENOUS; SUBCUTANEOUS AS NEEDED
Status: DISCONTINUED | OUTPATIENT
Start: 2021-03-19 | End: 2021-03-19 | Stop reason: HOSPADM

## 2021-03-19 RX ORDER — ROCURONIUM BROMIDE 10 MG/ML
INJECTION, SOLUTION INTRAVENOUS AS NEEDED
Status: DISCONTINUED | OUTPATIENT
Start: 2021-03-19 | End: 2021-03-19 | Stop reason: HOSPADM

## 2021-03-19 RX ORDER — PROPOFOL 10 MG/ML
INJECTION, EMULSION INTRAVENOUS AS NEEDED
Status: DISCONTINUED | OUTPATIENT
Start: 2021-03-19 | End: 2021-03-19 | Stop reason: HOSPADM

## 2021-03-19 RX ORDER — SODIUM CHLORIDE, SODIUM LACTATE, POTASSIUM CHLORIDE, CALCIUM CHLORIDE 600; 310; 30; 20 MG/100ML; MG/100ML; MG/100ML; MG/100ML
75 INJECTION, SOLUTION INTRAVENOUS CONTINUOUS
Status: DISCONTINUED | OUTPATIENT
Start: 2021-03-19 | End: 2021-03-19 | Stop reason: HOSPADM

## 2021-03-19 RX ORDER — ONDANSETRON 2 MG/ML
4 INJECTION INTRAMUSCULAR; INTRAVENOUS ONCE
Status: DISCONTINUED | OUTPATIENT
Start: 2021-03-19 | End: 2021-03-19 | Stop reason: HOSPADM

## 2021-03-19 RX ORDER — MIDAZOLAM HYDROCHLORIDE 1 MG/ML
INJECTION, SOLUTION INTRAMUSCULAR; INTRAVENOUS AS NEEDED
Status: DISCONTINUED | OUTPATIENT
Start: 2021-03-19 | End: 2021-03-19 | Stop reason: HOSPADM

## 2021-03-19 RX ADMIN — MIDAZOLAM HYDROCHLORIDE 2 MG: 2 INJECTION, SOLUTION INTRAMUSCULAR; INTRAVENOUS at 10:23

## 2021-03-19 RX ADMIN — LIDOCAINE HYDROCHLORIDE 60 MG: 20 INJECTION, SOLUTION EPIDURAL; INFILTRATION; INTRACAUDAL; PERINEURAL at 10:29

## 2021-03-19 RX ADMIN — Medication 3 MG: at 11:09

## 2021-03-19 RX ADMIN — SUCCINYLCHOLINE CHLORIDE 140 MG: 20 INJECTION, SOLUTION INTRAMUSCULAR; INTRAVENOUS at 10:30

## 2021-03-19 RX ADMIN — CEFAZOLIN SODIUM 2 G: 2 SOLUTION INTRAVENOUS at 10:32

## 2021-03-19 RX ADMIN — SODIUM CHLORIDE, SODIUM LACTATE, POTASSIUM CHLORIDE, AND CALCIUM CHLORIDE 75 ML/HR: 600; 310; 30; 20 INJECTION, SOLUTION INTRAVENOUS at 10:19

## 2021-03-19 RX ADMIN — Medication 15 MG: at 10:46

## 2021-03-19 RX ADMIN — ONDANSETRON 4 MG: 2 INJECTION INTRAMUSCULAR; INTRAVENOUS at 11:10

## 2021-03-19 RX ADMIN — HYDROMORPHONE HYDROCHLORIDE 0.5 MG: 2 INJECTION, SOLUTION INTRAMUSCULAR; INTRAVENOUS; SUBCUTANEOUS at 11:45

## 2021-03-19 RX ADMIN — GLYCOPYRROLATE 0.4 MG: 0.2 INJECTION INTRAMUSCULAR; INTRAVENOUS at 11:09

## 2021-03-19 RX ADMIN — PROPOFOL 150 MG: 10 INJECTION, EMULSION INTRAVENOUS at 10:29

## 2021-03-19 RX ADMIN — ROCURONIUM BROMIDE 30 MG: 50 INJECTION INTRAVENOUS at 10:33

## 2021-03-19 RX ADMIN — FENTANYL CITRATE 100 MCG: 50 INJECTION, SOLUTION INTRAMUSCULAR; INTRAVENOUS at 10:29

## 2021-03-19 RX ADMIN — Medication 15 MG: at 11:08

## 2021-03-19 RX ADMIN — FAMOTIDINE 20 MG: 20 TABLET ORAL at 10:16

## 2021-03-19 RX ADMIN — HYDROMORPHONE HYDROCHLORIDE 0.5 MG: 2 INJECTION, SOLUTION INTRAMUSCULAR; INTRAVENOUS; SUBCUTANEOUS at 11:55

## 2021-03-19 NOTE — ANESTHESIA POSTPROCEDURE EVALUATION
Procedure(s):  ROBOTIC ASSISTED REPAIR OF RIGHT INGUINAL HERNIA WITH MESH. general    Anesthesia Post Evaluation      Multimodal analgesia: multimodal analgesia used between 6 hours prior to anesthesia start to PACU discharge  Patient location during evaluation: PACU  Patient participation: complete - patient participated  Level of consciousness: awake and alert  Pain management: adequate  Airway patency: patent  Anesthetic complications: no  Cardiovascular status: acceptable and hemodynamically stable  Respiratory status: acceptable  Hydration status: acceptable  Post anesthesia nausea and vomiting:  controlled      INITIAL Post-op Vital signs:   Vitals Value Taken Time   /58 03/19/21 1155   Temp 36.3 °C (97.3 °F) 03/19/21 1125   Pulse 60 03/19/21 1201   Resp 14 03/19/21 1201   SpO2 100 % 03/19/21 1201   Vitals shown include unvalidated device data.

## 2021-03-19 NOTE — ANESTHESIA PREPROCEDURE EVALUATION
Relevant Problems   No relevant active problems       Anesthetic History   No history of anesthetic complications            Review of Systems / Medical History  Patient summary reviewed, nursing notes reviewed and pertinent labs reviewed    Pulmonary  Within defined limits                 Neuro/Psych   Within defined limits           Cardiovascular    Hypertension          Hyperlipidemia      Comments: 11/2019 ECHO  ESTIMATED EJECTION FRACTION 75%   GI/Hepatic/Renal  Within defined limits              Endo/Other    Diabetes    Arthritis     Other Findings            Physical Exam    Airway  Mallampati: II  TM Distance: 4 - 6 cm  Neck ROM: normal range of motion   Mouth opening: Normal     Cardiovascular    Rhythm: regular           Dental    Dentition: Upper dentition intact and Lower dentition intact     Pulmonary  Breath sounds clear to auscultation               Abdominal  GI exam deferred       Other Findings            Anesthetic Plan    ASA: 3  Anesthesia type: general            Anesthetic plan and risks discussed with: Patient

## 2021-03-19 NOTE — BRIEF OP NOTE
Brief Postoperative Note    Patient: Mile Caicedo  YOB: 1950  MRN: [id number]    Date of Procedure: 3/19/2021     Pre-Op Diagnosis: R10.31, RIGHT GROIN PAIN, ABD PAIN    Post-Op Diagnosis: Same as preoperative diagnosis. Procedure(s):  ROBOTIC ASSISTED REPAIR OF RIGHT INGUINAL HERNIA WITH MESH    Surgeon(s):  Salvador Marcelo MD    Surgical Assistant: Surg Asst-1: Adriana Ventura    Anesthesia: General     Estimated Blood Loss (mL): Minimal    Complications: None    Specimens: * No specimens in log *     Implants:   Implant Name Type Inv. Item Serial No.  Lot No. LRB No. Used Action   MESH DELILAH [id number] POLY POLYLACTIC ACID 70% CLLGN 30% GLYC - [id number]  MESH DELILAH [id number] POLY POLYLACTIC ACID 70% CLLGN 30% GLYC  [employer] [facility] SURGICAL_WD [id number] Right 1 Implanted       Drains: * No LDAs found *    Findings: Right inguinal indirect hernia. Adhesions.      Electronically Signed by Blaine Tejeda MD on 3/19/2021 at 11:26 AM

## 2021-03-19 NOTE — PROGRESS NOTES
Date of Surgery Update:  Elida Marcelo was seen and examined. History and physical has been reviewed. The patient has been examined. There have been no significant clinical changes since the completion of the originally dated History and Physical. Will proceed with robotic right inguinal hernia repair with placement of mesh, possible abdominal wall hernia repair with mesh.      Signed By: Zack Poole MD     March 19, 2021 9:42 AM

## 2021-03-19 NOTE — DISCHARGE INSTRUCTIONS
Discharge Instructions Following Surgery    Patient: Kasia Hooker MRN: [id number]  SSN: [id number]    YOB: 1950  Age: 66 y.o. Sex: male      Activity  · As tolerated, walking encourage, stairs are okay. · Avoid strenuous activities - no lifting anything heavier than 15 pounds till seen in the clinic. · You may shower at home after 24 hours. Diet  · Regular diet after nausea from the anesthetic has passed. Pain  · Take pain medication as directed by your doctor. · Call your doctor if pain is NOT relieved by medication. Wound and Dressing Care  · There is glue on the wounds. No need for any dressing care. · Apply ice packs to the area of the surgery for the first 1 to 2 days  · Apply warm compresses after 2 days for pain relieve if needed    After Anesthesia  · For the first 24 hours: DO NOT Drive, Drink alcoholic beverages, or Make important decisions. · Be aware of dizziness following anesthesia and while taking pain medication. Call your doctor if  · Excessive bleeding that does not stop after holding mild pressure over the area. · Temperature of 101 degrees F or above. · Redness,excessive swelling or bruising, and/or green or yellow, smelly discharge from incision. · If nausea and vomiting continues. Appointment date/time Follow-Up Phone Calls    · Call the office at [phone] to make your follow-up appointment in 2 weeks after the surgery (if not already set up) . Dr. Libby Mendoza cell phone number is [phone]. Please call me if you have any concerns or questions.      __________________________________________________________________      DISCHARGE SUMMARY from Nurse    PATIENT INSTRUCTIONS:    After general anesthesia or intravenous sedation, for 24 hours or while taking prescription Narcotics:  · Limit your activities  · Do not drive and operate hazardous machinery  · Do not make important personal or business decisions  · Do  not drink alcoholic beverages  · If you have not urinated within 8 hours after discharge, please contact your surgeon on call. Report the following to your surgeon:  · Excessive pain, swelling, redness or odor of or around the surgical area  · Temperature over 100.5  · Nausea and vomiting lasting longer than 4 hours or if unable to take medications  · Any signs of decreased circulation or nerve impairment to extremity: change in color, persistent  numbness, tingling, coldness or increase pain  · Any questions    *  Please give a list of your current medications to your Primary Care Provider. *  Please update this list whenever your medications are discontinued, doses are      changed, or new medications (including over-the-counter products) are added. *  Please carry medication information at all times in case of emergency situations. These are general instructions for a healthy lifestyle:    No smoking/ No tobacco products/ Avoid exposure to second hand smoke  Surgeon General's Warning:  Quitting smoking now greatly reduces serious risk to your health. Obesity, smoking, and sedentary lifestyle greatly increases your risk for illness    A healthy diet, regular physical exercise & weight monitoring are important for maintaining a healthy lifestyle    You may be retaining fluid if you have a history of heart failure or if you experience any of the following symptoms:  Weight gain of 3 pounds or more overnight or 5 pounds in a week, increased swelling in our hands or feet or shortness of breath while lying flat in bed. Please call your doctor as soon as you notice any of these symptoms; do not wait until your next office visit. The discharge information has been reviewed with the patient and daughter. The patient and daughter verbalized understanding.   Discharge medications reviewed with the patient and daughter and appropriate educational materials and side effects teaching were provided.   ___________________________________________________________________________________________________________________________________

## 2021-03-20 NOTE — OP NOTES
[address] [place] [address]   OPERATIVE REPORT    Name:  Nadia Gilbert  MR#:   [id number]  :  1950  ACCOUNT #:  [de-identified]  DATE OF SERVICE:  2021    PREOPERATIVE DIAGNOSIS:  Right inguinal hernia. POSTOPERATIVE DIAGNOSES:  Right indirect inguinal hernia and adhesions. PROCEDURES PERFORMED:  Robotic lysis of adhesions and repair of right inguinal hernia. SURGEON:  Lisa Guillaume. Chadwick Rojo MD.    ASSISTANT:  Marry Baptiste. ANESTHESIA:  General.    COMPLICATIONS:  None. SPECIMENS REMOVED:  None. IMPLANTS:  ProGrip Covidien mesh 4 x 6 inches. ESTIMATED BLOOD LOSS:  Minimal.    DESCRIPTION OF PROCEDURE:  The patient was brought to the operating room. Anesthesia was induced. Scrubbing and draping of the abdomen were done in the usual manner. A time-out was performed. A skin incision was performed in the supraumbilical area. Veress needle was inserted. Saline drop test was performed. Abdomen was insufflated. An 8 mm port was inserted. Upon exploration of the abdomen, there was significant adhesion between the omentum and the anterior abdominal wall, but there was clearly a clear plane where we could place an 8 mm port in the left side of the abdominal wall. Then, we looked through this port and we made sure that the supraumbilical port was placed clear and not through the bowel. It was clearly normal, away from any bowel, but there was again adhesion between the omentum and the anterior abdominal wall. Again, under direct visualization, another port was placed in the right side of the abdominal wall. Then, the patient was placed in Trendelenburg position and the robot was docked. First, I did lysis of adhesion by using the scissors, and once we did the lysis of adhesion of a few of them on the omentum, we were able to see clearly a right indirect inguinal hernia. At this point, the peritoneum was opened in the right groin. The preperitoneal space was dissected.   There was some significant adhesion, probably because of the previous prostatectomy. This was dissected gently and we were able to reduce the hernia sac completely. We identified the inferior epigastric vessels, which were protected as well as that cord structure. The Otto's ligament was fine. At this point, a ProGrip Covidien mesh 4 x 6 inches was opened. It was cut to 4 x 4 inches, and then, it was placed inside the abdomen with the rough side toward the abdominal wall and smooth side toward the bowel, and then, after affixing it, we closed the peritoneum on top of the mesh with 2-0 Vicryl in a running fashion to cover the mesh completely. Then, the needle was taken out and instruments were removed. Exploration of the abdomen again revealed no injury to the bowel at all and good hemostasis. So, at this point, the skin incisions were closed with 4-0 Monocryl and glue.       Diane Martinez MD      [id number]  D:  03/19/2021 11:23  T:  03/19/2021 21:07  JOB #:  [id number]

## 2021-04-01 ENCOUNTER — OFFICE VISIT (OUTPATIENT)
Dept: SURGERY | Age: 71
End: 2021-04-01
Payer: MEDICARE

## 2021-04-01 VITALS
HEIGHT: 74 IN | BODY MASS INDEX: 26.18 KG/M2 | HEART RATE: 84 BPM | WEIGHT: 204 LBS | TEMPERATURE: 97 F | SYSTOLIC BLOOD PRESSURE: 163 MMHG | OXYGEN SATURATION: 98 % | DIASTOLIC BLOOD PRESSURE: 87 MMHG

## 2021-04-01 DIAGNOSIS — Z09 POSTOPERATIVE EXAMINATION: Primary | ICD-10-CM

## 2021-04-01 PROCEDURE — 99024 POSTOP FOLLOW-UP VISIT: CPT | Performed by: SURGERY

## 2021-04-01 NOTE — PROGRESS NOTES
Mahesh Oliva is a 66 y.o. male (: 1950) presenting to address:    Chief Complaint   Patient presents with    Surgical Follow-up     Repair of right inguinal hernia 4x4 mesh 21       Medication list and allergies have been reviewed with Aragon Pj and updated as of today's date. I have gone over all Medical, Surgical and Social History with Mahesh Oliva and updated/added the information accordingly. 1. Have you been to the ER, Urgent Care or Hospitalized since your last visit? YES. Barbara dumont 21 urinary retention      2. Have you followed up with your PCP or any other Physicians since your procedure/ last office visit?    NO

## 2021-04-01 NOTE — PROGRESS NOTES
Patient seen and examined. I had a call from the emergency room few days ago stating that the patient came to the emergency room because he however was peeing multiple times and he had some pressure in the pelvis. He did a CT scan that showed a relatively large collection of about 12 x 5 x 5 cm consistent with either a hematoma or a seroma. When I spoke with the ER team he stated that the patient looks well and his vital signs were normal with no fever or tachycardia and his labs including his WBC are normal.  Also it seems his examination was completely benign so we decided to just observe it given the fact that he has severe intra-abdominal adhesion I assume this is a hematoma. The patient has a history of prostate cancer in the past and he had a robotic prostatectomy so I assume that his urinary retention are related to the hematoma. The patient has stated that before the robotic hernia repair he used to wake up at night 6-7 times however now he wakes up 1 or 2 times which is kind of strange for me but I am happy with that. From the standpoint of the hernia he has no complaints and on examination his abdomen is soft and nontender and his wounds are healing well and his right groin exam is normal.   Since the patient is feeling much better now and that he has no problem with urination I explained to him that for sure it safe to continue observing it and that this hematoma/seroma will decrease with size.   I also told him to follow-up with urology in couple months for further evaluation if needed

## 2022-05-05 PROBLEM — M47.816 LUMBAR SPONDYLOSIS: Status: ACTIVE | Noted: 2019-03-21

## 2022-05-05 PROBLEM — H25.12 AGE-RELATED NUCLEAR CATARACT, LEFT EYE: Status: ACTIVE | Noted: 2022-05-05

## 2022-05-05 PROBLEM — Z96.1 PRESENCE OF INTRAOCULAR LENS: Status: ACTIVE | Noted: 2022-05-05

## 2022-05-05 PROBLEM — M47.819 FACET ARTHROPATHY: Status: ACTIVE | Noted: 2019-03-21

## 2025-01-21 ENCOUNTER — TELEPHONE (OUTPATIENT)
Facility: HOSPITAL | Age: 75
End: 2025-01-21

## 2025-01-21 NOTE — TELEPHONE ENCOUNTER
I called  to see if he wanted to make another appointment since he canceled his last one. He did not answer, so I left a message giving him an option to call us back if he does want to schedule another appointment.

## (undated) DEVICE — INTENDED FOR TISSUE SEPARATION, AND OTHER PROCEDURES THAT REQUIRE A SHARP SURGICAL BLADE TO PUNCTURE OR CUT.: Brand: BARD-PARKER ®  SAFETY SCALPED

## (undated) DEVICE — INSUFFLATION NEEDLE TO ESTABLISH PNEUMOPERITONEUM.: Brand: INSUFFLATION NEEDLE

## (undated) DEVICE — COVER LT HNDL FLX

## (undated) DEVICE — SEAL UNIV 5-8MM DISP BX/10 -- DA VINCI XI - SNGL USE

## (undated) DEVICE — ARM DRAPE

## (undated) DEVICE — SYR 10ML LUER LOK 1/5ML GRAD --

## (undated) DEVICE — REM POLYHESIVE ADULT PATIENT RETURN ELECTRODE: Brand: VALLEYLAB

## (undated) DEVICE — GOWN,SIRUS,POLYRNF,SETINSLV,XL,20/CS: Brand: MEDLINE

## (undated) DEVICE — PREP SKN CHLRAPRP APL 26ML STR --

## (undated) DEVICE — GARMENT,MEDLINE,DVT,INT,THIGH,M, GEN2: Brand: MEDLINE

## (undated) DEVICE — STERILE POLYISOPRENE POWDER-FREE SURGICAL GLOVES: Brand: PROTEXIS

## (undated) DEVICE — KIT,ANTI FOG,W/SPONGE & FLUID,SOFT PACK: Brand: MEDLINE

## (undated) DEVICE — BLANKET WRM AD W50XL85.8IN PACU FULL BODY FORC AIR

## (undated) DEVICE — SUTURE MCRYL SZ 4-0 L27IN ABSRB UD L24MM PS-1 3/8 CIR PRIM Y935H

## (undated) DEVICE — ELECTRO LUBE IS A SINGLE PATIENT USE DEVICE THAT IS INTENDED TO BE USED ON ELECTROSURGICAL ELECTRODES TO REDUCE STICKING.: Brand: KEY SURGICAL ELECTRO LUBE

## (undated) DEVICE — Device

## (undated) DEVICE — TIP COVER ACCESSORY

## (undated) DEVICE — SOLUTION IV 1000ML 0.9% SOD CHL

## (undated) DEVICE — GARMENT,MEDLINE,DVT,INT,CALF,MED, GEN2: Brand: MEDLINE

## (undated) DEVICE — SUTURE VLOC 90 2/0 VL 6 GS-22 VLOCM2105

## (undated) DEVICE — COLUMN DRAPE

## (undated) DEVICE — MAYO STAND COVER: Brand: CONVERTORS

## (undated) DEVICE — DERMABOND SKIN ADH 0.7ML -- DERMABOND ADVANCED 12/BX

## (undated) DEVICE — BLADELESS OBTURATOR: Brand: WECK VISTA

## (undated) DEVICE — DRAPE TOWEL: Brand: CONVERTORS

## (undated) DEVICE — SUTURE VCRL SZ 2-0 L27IN ABSRB UD L26MM SH 1/2 CIR J417H